# Patient Record
Sex: FEMALE | Race: WHITE
[De-identification: names, ages, dates, MRNs, and addresses within clinical notes are randomized per-mention and may not be internally consistent; named-entity substitution may affect disease eponyms.]

---

## 2018-01-08 ENCOUNTER — HOSPITAL ENCOUNTER (OUTPATIENT)
Dept: HOSPITAL 89 - LAB | Age: 23
End: 2018-01-08
Attending: NURSE PRACTITIONER
Payer: SELF-PAY

## 2018-01-08 DIAGNOSIS — I10: ICD-10-CM

## 2018-01-08 DIAGNOSIS — E10.65: Primary | ICD-10-CM

## 2018-01-08 LAB — PLATELET COUNT, AUTOMATED: 724 K/UL (ref 150–450)

## 2018-01-08 PROCEDURE — 84075 ASSAY ALKALINE PHOSPHATASE: CPT

## 2018-01-08 PROCEDURE — 85027 COMPLETE CBC AUTOMATED: CPT

## 2018-01-08 PROCEDURE — 82310 ASSAY OF CALCIUM: CPT

## 2018-01-08 PROCEDURE — 84460 ALANINE AMINO (ALT) (SGPT): CPT

## 2018-01-08 PROCEDURE — 84295 ASSAY OF SERUM SODIUM: CPT

## 2018-01-08 PROCEDURE — 82374 ASSAY BLOOD CARBON DIOXIDE: CPT

## 2018-01-08 PROCEDURE — 82435 ASSAY OF BLOOD CHLORIDE: CPT

## 2018-01-08 PROCEDURE — 82040 ASSAY OF SERUM ALBUMIN: CPT

## 2018-01-08 PROCEDURE — 82565 ASSAY OF CREATININE: CPT

## 2018-01-08 PROCEDURE — 82247 BILIRUBIN TOTAL: CPT

## 2018-01-08 PROCEDURE — 84155 ASSAY OF PROTEIN SERUM: CPT

## 2018-01-08 PROCEDURE — 84450 TRANSFERASE (AST) (SGOT): CPT

## 2018-01-08 PROCEDURE — 84132 ASSAY OF SERUM POTASSIUM: CPT

## 2018-01-08 PROCEDURE — 84520 ASSAY OF UREA NITROGEN: CPT

## 2018-01-08 PROCEDURE — 36415 COLL VENOUS BLD VENIPUNCTURE: CPT

## 2018-01-08 PROCEDURE — 82947 ASSAY GLUCOSE BLOOD QUANT: CPT

## 2018-01-20 ENCOUNTER — HOSPITAL ENCOUNTER (EMERGENCY)
Dept: HOSPITAL 89 - ER | Age: 23
Discharge: HOME | End: 2018-01-20
Payer: SELF-PAY

## 2018-01-20 VITALS — BODY MASS INDEX: 18.82 KG/M2 | WEIGHT: 106.19 LBS | HEIGHT: 63 IN

## 2018-01-20 VITALS — DIASTOLIC BLOOD PRESSURE: 73 MMHG | SYSTOLIC BLOOD PRESSURE: 97 MMHG

## 2018-01-20 DIAGNOSIS — E10.65: Primary | ICD-10-CM

## 2018-01-20 LAB — PLATELET COUNT, AUTOMATED: 474 K/UL (ref 150–450)

## 2018-01-20 PROCEDURE — 84132 ASSAY OF SERUM POTASSIUM: CPT

## 2018-01-20 PROCEDURE — 82247 BILIRUBIN TOTAL: CPT

## 2018-01-20 PROCEDURE — 84703 CHORIONIC GONADOTROPIN ASSAY: CPT

## 2018-01-20 PROCEDURE — 82040 ASSAY OF SERUM ALBUMIN: CPT

## 2018-01-20 PROCEDURE — 82310 ASSAY OF CALCIUM: CPT

## 2018-01-20 PROCEDURE — 84155 ASSAY OF PROTEIN SERUM: CPT

## 2018-01-20 PROCEDURE — 82374 ASSAY BLOOD CARBON DIOXIDE: CPT

## 2018-01-20 PROCEDURE — 84450 TRANSFERASE (AST) (SGOT): CPT

## 2018-01-20 PROCEDURE — 96360 HYDRATION IV INFUSION INIT: CPT

## 2018-01-20 PROCEDURE — 82435 ASSAY OF BLOOD CHLORIDE: CPT

## 2018-01-20 PROCEDURE — 82009 KETONE BODYS QUAL: CPT

## 2018-01-20 PROCEDURE — 84460 ALANINE AMINO (ALT) (SGPT): CPT

## 2018-01-20 PROCEDURE — 82565 ASSAY OF CREATININE: CPT

## 2018-01-20 PROCEDURE — 83930 ASSAY OF BLOOD OSMOLALITY: CPT

## 2018-01-20 PROCEDURE — 84520 ASSAY OF UREA NITROGEN: CPT

## 2018-01-20 PROCEDURE — 85025 COMPLETE CBC W/AUTO DIFF WBC: CPT

## 2018-01-20 PROCEDURE — 84075 ASSAY ALKALINE PHOSPHATASE: CPT

## 2018-01-20 PROCEDURE — 84295 ASSAY OF SERUM SODIUM: CPT

## 2018-01-20 PROCEDURE — 99284 EMERGENCY DEPT VISIT MOD MDM: CPT

## 2018-01-20 PROCEDURE — 82803 BLOOD GASES ANY COMBINATION: CPT

## 2018-01-20 PROCEDURE — 82947 ASSAY GLUCOSE BLOOD QUANT: CPT

## 2018-01-20 NOTE — ER REPORT
History and Physical


Time Seen By MD:  09:13


HPI/ROS


21 y/o female with known DM1 was called by her PCM to come to the ED becasue of 

abnormal labs that were obtained yesterday. The patient states that she did not 

want to come to the ED, because she otherwise feels well. Says she only came to 

the ED, b/c the police came to her home for a welfare check. She sais that 

prior to getting her labs done yesterday, she ate a very big lunch. Again, she 

otherwise has no complaints, and states that she has tried to be more compliant 

with her DM.


Remainder of the 14 system rev:  Yes


Allergies:  


Coded Allergies:  


     amoxicillin (Verified  Allergy, Severe, 10/13/17)


Home Meds


Reported Medications


Insulin Lispro (HUMALOG) 100 Unit/1 Ml Vial, 100 UNIT SQ PRN, VIAL


   12/9/17


Insulin Glargine (LANTUS) 100 Unit/Ml Soln, 100 UNIT SUBQ PRN, ML


   12/9/17


Reviewed Nurses Notes:  Yes


Old Medical Records Reviewed:  Yes


Smoking Status:  Never Smoker


Hx Substance Use Disorder:  No


Hx Alcohol Use:  No


Constitutional





Vital Sign - Last 24 Hours








 1/20/18





 09:19


 


Temp 97.7


 


Pulse 86


 


Resp 20


 


B/P (MAP) 97/73


 


Pulse Ox 98


 


O2 Delivery Room Air








Physical Exam


  General Appearance: The patient is alert, has no immediate need for airway 

protection and no current signs of toxicity.  


Eyes: PERRL, EoMI


Respiratory: Chest is non tender, lungs are clear to auscultation.


Cardiac: regular rate and rhythm 


Gastrointestinal: Abdomen is soft and non tender, no masses, bowel sounds 

normal.


Neck: Neck is supple and non tender.


Extremities have full range of motion and are non tender.


Skin: No rashes or lesions.





DIFFERENTIAL DIAGNOSIS: After history and physical exam differential diagnosis 

was considered for DKA, infection, hyperglycemia





Medical Decision Making


Data Points


Result Diagram:  


1/20/18 0932                                                                   

             1/20/18 0932





Laboratory





Hematology








Test


  1/20/18


09:32


 


Red Blood Count


  4.63 M/uL


(4.17-5.56)


 


Mean Corpuscular Volume


  89.3 fL


(80.0-96.0)


 


Mean Corpuscular Hemoglobin


  29.3 pg


(26.0-33.0)


 


Mean Corpuscular Hemoglobin


Concent 32.8 g/dL


(32.0-36.0)


 


Red Cell Distribution Width


  15.7 %


(11.5-14.5)


 


Mean Platelet Volume


  8.4 fL


(7.2-11.1)


 


Neutrophils (%) (Auto)


  52.4 %


(39.4-72.5)


 


Lymphocytes (%) (Auto)


  33.0 %


(17.6-49.6)


 


Monocytes (%) (Auto)


  6.3 %


(4.1-12.4)


 


Eosinophils (%) (Auto)


  7.0 %


(0.4-6.7)


 


Basophils (%) (Auto)


  1.3 %


(0.3-1.4)


 


Nucleated RBC Relative Count


(auto) 0.0 /100WBC 


 


 


Neutrophils # (Auto)


  4.2 K/uL


(2.0-7.4)


 


Lymphocytes # (Auto)


  2.6 K/uL


(1.3-3.6)


 


Monocytes # (Auto)


  0.5 K/uL


(0.3-1.0)


 


Eosinophils # (Auto)


  0.6 K/uL


(0.0-0.5)


 


Basophils # (Auto)


  0.1 K/uL


(0.0-0.1)


 


Nucleated RBC Absolute Count


(auto) 0.00 K/uL 


 


 


Blood Gas Patient Temperature


  Unknown


DEGREES


 


Venous Blood pH


  7.24


(7.31-7.41)


 


Venous Blood Partial Pressure


CO2 51 mmHg 


 


 


Venous Blood Partial Pressure


O2 < 35 mmHg 


 


 


Venous Blood HCO3 22 mmol/L 


 


Venous Blood Oxygen Saturation 41 % 


 


Venous Blood Base Excess -6 mmol/L 


 


Oxygen Liters/Minute Unknown 


 


Sodium Level


  138 mmol/L


(137-145)


 


Potassium Level


  4.2 mmol/L


(3.5-5.0)


 


Chloride Level


  100 mmol/L


()


 


Carbon Dioxide Level


  23 mmol/L


(22-31)


 


Blood Urea Nitrogen


  46 mg/dl


(7-18)


 


Creatinine


  3.70 mg/dl


(0.52-1.04)


 


Glomerular Filtration Rate


Calc 15.2 


 


 


Random Glucose


  211 mg/dl


()


 


Osmolality


  298 mOSM/K


(275-295)


 


Calcium Level


  10.4 mg/dl


(8.4-10.2)


 


Total Bilirubin


  0.6 mg/dl


(0.2-1.3)


 


Aspartate Amino Transf


(AST/SGOT) 20 U/L (0-35) 


 


 


Alanine Aminotransferase


(ALT/SGPT) 19 U/L (0-56) 


 


 


Alkaline Phosphatase


  131 U/L


(0-126)


 


Total Protein


  8.5 gm/dl


(6.3-8.2)


 


Albumin


  4.5 g/dl


(3.5-5.0)


 


Human Chorionic Gonadotropin,


Qual Negative


(NEGATIVE)


 


Acetone, Qualitative Negative 








Chemistry








Test


  1/20/18


09:32


 


White Blood Count


  8.0 k/uL


(4.5-11.0)


 


Red Blood Count


  4.63 M/uL


(4.17-5.56)


 


Hemoglobin


  13.6 g/dL


(12.0-16.0)


 


Hematocrit


  41.3 %


(34.0-47.0)


 


Mean Corpuscular Volume


  89.3 fL


(80.0-96.0)


 


Mean Corpuscular Hemoglobin


  29.3 pg


(26.0-33.0)


 


Mean Corpuscular Hemoglobin


Concent 32.8 g/dL


(32.0-36.0)


 


Red Cell Distribution Width


  15.7 %


(11.5-14.5)


 


Platelet Count


  474 K/uL


(150-450)


 


Mean Platelet Volume


  8.4 fL


(7.2-11.1)


 


Neutrophils (%) (Auto)


  52.4 %


(39.4-72.5)


 


Lymphocytes (%) (Auto)


  33.0 %


(17.6-49.6)


 


Monocytes (%) (Auto)


  6.3 %


(4.1-12.4)


 


Eosinophils (%) (Auto)


  7.0 %


(0.4-6.7)


 


Basophils (%) (Auto)


  1.3 %


(0.3-1.4)


 


Nucleated RBC Relative Count


(auto) 0.0 /100WBC 


 


 


Neutrophils # (Auto)


  4.2 K/uL


(2.0-7.4)


 


Lymphocytes # (Auto)


  2.6 K/uL


(1.3-3.6)


 


Monocytes # (Auto)


  0.5 K/uL


(0.3-1.0)


 


Eosinophils # (Auto)


  0.6 K/uL


(0.0-0.5)


 


Basophils # (Auto)


  0.1 K/uL


(0.0-0.1)


 


Nucleated RBC Absolute Count


(auto) 0.00 K/uL 


 


 


Blood Gas Patient Temperature


  Unknown


DEGREES


 


Venous Blood pH


  7.24


(7.31-7.41)


 


Venous Blood Partial Pressure


CO2 51 mmHg 


 


 


Venous Blood Partial Pressure


O2 < 35 mmHg 


 


 


Venous Blood HCO3 22 mmol/L 


 


Venous Blood Oxygen Saturation 41 % 


 


Venous Blood Base Excess -6 mmol/L 


 


Oxygen Liters/Minute Unknown 


 


Glomerular Filtration Rate


Calc 15.2 


 


 


Osmolality


  298 mOSM/K


(275-295)


 


Calcium Level


  10.4 mg/dl


(8.4-10.2)


 


Total Bilirubin


  0.6 mg/dl


(0.2-1.3)


 


Aspartate Amino Transf


(AST/SGOT) 20 U/L (0-35) 


 


 


Alanine Aminotransferase


(ALT/SGPT) 19 U/L (0-56) 


 


 


Alkaline Phosphatase


  131 U/L


(0-126)


 


Total Protein


  8.5 gm/dl


(6.3-8.2)


 


Albumin


  4.5 g/dl


(3.5-5.0)


 


Human Chorionic Gonadotropin,


Qual Negative


(NEGATIVE)


 


Acetone, Qualitative Negative 








Toxicology








Test


  1/20/18


09:32


 


Acetone, Qualitative Negative 











ED Course/Re-evaluation


ED Course


This patient is a 22-year-old female with diabetes type I since the age of 1 

who was sent to the emergency department by her primary care physician and the 

police via a welfare check due to abnormal labs that were drawn yesterday. The 

patient otherwise has no complaints, and states that she is trying to be more 

compliant with her diabetes regimen. Today her labs were relatively normal with 

a glucose of 211, normal sodium, bicarbonate of 23, and an anion gap of 15. She 

received a liter of normal saline to help with her hyperglycemia. She has no 

evidence of infection. She is not in DKA. I counseled her at length about being 

more compliant with her diabetes so that she does not have to come back to the 

emergency department. She voices understanding, and says that she is making an 

effort to be more compliant. She will follow-up with her primary care doctor, 

Dr. Lorenzana.


Decision to Disposition Date:  Jan 20, 2018


Decision to Disposition Time:  10:32





Depart


Departure


Latest Vital Signs





Vital Signs








  Date Time  Temp Pulse Resp B/P (MAP) Pulse Ox O2 Delivery O2 Flow Rate FiO2


 


1/20/18 09:19 97.7 86 20 97/73 98 Room Air  








Impression:  


 Primary Impression:  


 Hyperglycemia due to type 1 diabetes mellitus


Condition:  Improved


Disposition:  HOME OR SELF-CARE


Patient Instructions:  Diabetic Hyperglycemia (ED)











SALEEM GOULD MD Jan 20, 2018 09:14

## 2018-02-02 ENCOUNTER — HOSPITAL ENCOUNTER (OUTPATIENT)
Dept: HOSPITAL 89 - AMB | Age: 23
End: 2018-02-02
Payer: SELF-PAY

## 2018-02-02 DIAGNOSIS — E10.649: Primary | ICD-10-CM

## 2018-08-04 ENCOUNTER — TRANSFERRED RECORDS (OUTPATIENT)
Dept: HEALTH INFORMATION MANAGEMENT | Facility: CLINIC | Age: 23
End: 2018-08-04

## 2018-08-27 ENCOUNTER — TRANSFERRED RECORDS (OUTPATIENT)
Dept: HEALTH INFORMATION MANAGEMENT | Facility: CLINIC | Age: 23
End: 2018-08-27

## 2018-08-28 ENCOUNTER — TRANSFERRED RECORDS (OUTPATIENT)
Dept: HEALTH INFORMATION MANAGEMENT | Facility: CLINIC | Age: 23
End: 2018-08-28

## 2018-09-12 ENCOUNTER — TRANSFERRED RECORDS (OUTPATIENT)
Dept: HEALTH INFORMATION MANAGEMENT | Facility: CLINIC | Age: 23
End: 2018-09-12

## 2018-09-19 ENCOUNTER — TRANSFERRED RECORDS (OUTPATIENT)
Dept: HEALTH INFORMATION MANAGEMENT | Facility: CLINIC | Age: 23
End: 2018-09-19

## 2018-09-20 DIAGNOSIS — H57.10 BLIND PAINFUL EYE: Primary | ICD-10-CM

## 2018-09-20 DIAGNOSIS — H54.40 BLIND PAINFUL EYE: Primary | ICD-10-CM

## 2018-09-21 ENCOUNTER — TELEPHONE (OUTPATIENT)
Dept: OPHTHALMOLOGY | Facility: CLINIC | Age: 23
End: 2018-09-21

## 2018-09-21 NOTE — TELEPHONE ENCOUNTER
Called patient to schedule procedure with Dr. Maxwell Navarro. I offered patient Oct 8th clinic visit and Oct 10th surgery.  Patient stated she will think about it and call me back once she is ready to proceed. Patient has my direct number. [phone].

## 2018-09-26 ENCOUNTER — HOSPITAL ENCOUNTER (OUTPATIENT)
Facility: AMBULATORY SURGERY CENTER | Age: 23
End: 2018-09-26
Attending: OPHTHALMOLOGY
Payer: MEDICARE

## 2018-09-26 NOTE — TELEPHONE ENCOUNTER
Spoke with patient to schedule surgery with Dr. Maxwell Navarro    Surgery was scheduled on 11/14 at [facility]    [facility]  Post-Op care appointment will be on 11/19  Patient is aware a / is needed day of surgery.   Surgery packet was mailed, patient has my direct contact information for any further questions.

## 2018-11-07 NOTE — TELEPHONE ENCOUNTER
FUTURE VISIT INFORMATION      FUTURE VISIT INFORMATION:    Date: 11/12/18    Time: 200pm    Location: [facility]  REFERRAL INFORMATION:    Referring provider:  Navid Mayer    Referring providers clinic:  [facility]    Reason for visit/diagnosis  enuc eval    RECORDS REQUESTED FROM:       Clinic name Comments Records Status Imaging Status   [facility] Notes transferred to HIM IN [facility] Labs transferred to HIM IN HIM

## 2018-11-12 ENCOUNTER — ANESTHESIA EVENT (OUTPATIENT)
Dept: SURGERY | Facility: AMBULATORY SURGERY CENTER | Age: 23
End: 2018-11-12

## 2018-11-12 ENCOUNTER — PRE VISIT (OUTPATIENT)
Dept: OPHTHALMOLOGY | Facility: CLINIC | Age: 23
End: 2018-11-12

## 2018-11-12 RX ORDER — MEPERIDINE HYDROCHLORIDE 25 MG/ML
12.5 INJECTION INTRAMUSCULAR; INTRAVENOUS; SUBCUTANEOUS
Status: CANCELLED | OUTPATIENT
Start: 2018-11-12

## 2018-11-12 RX ORDER — CELECOXIB 200 MG/1
200 CAPSULE ORAL ONCE
Status: CANCELLED | OUTPATIENT
Start: 2018-11-12 | End: 2018-11-12

## 2018-11-12 RX ORDER — HYDROMORPHONE HYDROCHLORIDE 1 MG/ML
.3-.5 INJECTION, SOLUTION INTRAMUSCULAR; INTRAVENOUS; SUBCUTANEOUS EVERY 10 MIN PRN
Status: CANCELLED | OUTPATIENT
Start: 2018-11-12

## 2018-11-12 RX ORDER — HYDRALAZINE HYDROCHLORIDE 20 MG/ML
2.5-5 INJECTION INTRAMUSCULAR; INTRAVENOUS EVERY 10 MIN PRN
Status: CANCELLED | OUTPATIENT
Start: 2018-11-12

## 2018-11-12 RX ORDER — ONDANSETRON 2 MG/ML
4 INJECTION INTRAMUSCULAR; INTRAVENOUS EVERY 30 MIN PRN
Status: CANCELLED | OUTPATIENT
Start: 2018-11-12

## 2018-11-12 RX ORDER — ACETAMINOPHEN 325 MG/1
975 TABLET ORAL ONCE
Status: CANCELLED | OUTPATIENT
Start: 2018-11-12 | End: 2018-11-12

## 2018-11-12 RX ORDER — NALOXONE HYDROCHLORIDE 0.4 MG/ML
.1-.4 INJECTION, SOLUTION INTRAMUSCULAR; INTRAVENOUS; SUBCUTANEOUS
Status: CANCELLED | OUTPATIENT
Start: 2018-11-12 | End: 2018-11-13

## 2018-11-12 RX ORDER — LABETALOL HYDROCHLORIDE 5 MG/ML
10 INJECTION, SOLUTION INTRAVENOUS
Status: CANCELLED | OUTPATIENT
Start: 2018-11-12

## 2018-11-12 RX ORDER — LIDOCAINE 40 MG/G
CREAM TOPICAL
Status: CANCELLED | OUTPATIENT
Start: 2018-11-12

## 2018-11-12 RX ORDER — OXYCODONE HCL 5 MG/5 ML
5 SOLUTION, ORAL ORAL EVERY 4 HOURS PRN
Status: CANCELLED | OUTPATIENT
Start: 2018-11-12

## 2018-11-12 RX ORDER — FENTANYL CITRATE 50 UG/ML
25-50 INJECTION, SOLUTION INTRAMUSCULAR; INTRAVENOUS
Status: CANCELLED | OUTPATIENT
Start: 2018-11-12

## 2018-11-12 RX ORDER — SCOLOPAMINE TRANSDERMAL SYSTEM 1 MG/1
1 PATCH, EXTENDED RELEASE TRANSDERMAL ONCE
Status: CANCELLED | OUTPATIENT
Start: 2018-11-12 | End: 2018-11-12

## 2018-11-12 RX ORDER — SODIUM CHLORIDE, SODIUM LACTATE, POTASSIUM CHLORIDE, CALCIUM CHLORIDE 600; 310; 30; 20 MG/100ML; MG/100ML; MG/100ML; MG/100ML
INJECTION, SOLUTION INTRAVENOUS CONTINUOUS
Status: CANCELLED | OUTPATIENT
Start: 2018-11-12

## 2018-11-12 RX ORDER — ONDANSETRON 4 MG/1
4 TABLET, ORALLY DISINTEGRATING ORAL EVERY 30 MIN PRN
Status: CANCELLED | OUTPATIENT
Start: 2018-11-12

## 2018-11-12 RX ORDER — GABAPENTIN 300 MG/1
300 CAPSULE ORAL ONCE
Status: CANCELLED | OUTPATIENT
Start: 2018-11-12 | End: 2018-11-12

## 2018-11-13 NOTE — TELEPHONE ENCOUNTER
Patient left voicemail asking for surgery to be cancelled as A1C remains very high.     Surgery and Post-Op were cancelled.     Called patient, there was no answer, left detail message with my call back number.  I asked her to call us back to let us know what her A1C is and I would send the information to Dr. Navarro.

## 2018-11-13 NOTE — ANESTHESIA PREPROCEDURE EVALUATION
Anesthesia Pre-Procedure Evaluation    Patient: Sheela Rogers   MRN:     [id number] Gender:   female   Age:    23 year old :      1995        Preoperative Diagnosis: Blind, Painful Eye   Procedure(s):  Left Evisceration with Implant     No past medical history on file.   No past surgical history on file.            VAISHALI HERNANDEZ AN PHYSICAL EXAM    No results found for: WBC, HGB, HCT, PLT, CRP, SED, NA, POTASSIUM, CHLORIDE, CO2, BUN, CR, GLC, SABINA, PHOS, MAG, ALBUMIN, PROTTOTAL, ALT, AST, GGT, ALKPHOS, BILITOTAL, BILIDIRECT, LIPASE, AMYLASE, KELLI, PTT, INR, FIBR, TSH, T4, T3, HCG, HCGS, CKTOTAL, CKMB, TROPN    Preop Vitals  BP Readings from Last 3 Encounters:   No data found for BP    Pulse Readings from Last 3 Encounters:   No data found for Pulse      Resp Readings from Last 3 Encounters:   No data found for Resp    SpO2 Readings from Last 3 Encounters:   No data found for SpO2      Temp Readings from Last 1 Encounters:   No data found for Temp    Ht Readings from Last 1 Encounters:   No data found for Ht      Wt Readings from Last 1 Encounters:   No data found for Wt    There is no height or weight on file to calculate BMI.     LDA:            VAISHALI HERNANDEZ AN PLAN NO PONV RULE                Kanu Richard MD

## 2018-11-14 ENCOUNTER — TELEPHONE (OUTPATIENT)
Dept: OPHTHALMOLOGY | Facility: CLINIC | Age: 23
End: 2018-11-14

## 2018-11-14 ENCOUNTER — ANESTHESIA (OUTPATIENT)
Dept: SURGERY | Facility: AMBULATORY SURGERY CENTER | Age: 23
End: 2018-11-14

## 2019-07-23 ENCOUNTER — TRANSFERRED RECORDS (OUTPATIENT)
Dept: HEALTH INFORMATION MANAGEMENT | Facility: CLINIC | Age: 24
End: 2019-07-23

## 2019-07-24 ENCOUNTER — MEDICAL CORRESPONDENCE (OUTPATIENT)
Dept: HEALTH INFORMATION MANAGEMENT | Facility: CLINIC | Age: 24
End: 2019-07-24

## 2019-08-01 ENCOUNTER — TELEPHONE (OUTPATIENT)
Dept: OPHTHALMOLOGY | Facility: CLINIC | Age: 24
End: 2019-08-01

## 2019-08-01 NOTE — TELEPHONE ENCOUNTER
Dr. Navarro- Do you need to see patient again or can she just reschedule surgery?  Looks like she's from [place].  Marlene Benitez RN 4:09 PM 08/01/19

## 2019-08-01 NOTE — TELEPHONE ENCOUNTER
Note to Dr. Fang/RN to assist in scheduling   previously scheduled for evisceration  Jose Layton RN 3:56 PM 08/01/19           [facility] Call Center    Phone Message    May a detailed message be left on voicemail: yes    Reason for Call: Other: Devon Mayer referring patient back, would like to reschedule surgery with Dr. Navarro. Please contact patient     Action Taken: Message routed to:  [facility]

## 2019-08-05 ENCOUNTER — TELEPHONE (OUTPATIENT)
Dept: OPHTHALMOLOGY | Facility: CLINIC | Age: 24
End: 2019-08-05

## 2019-08-05 NOTE — TELEPHONE ENCOUNTER
Called patient to schedule procedure with Dr. Maxwell Navarro, there was no answer.  Left message with my direct line [phone].